# Patient Record
Sex: FEMALE | URBAN - METROPOLITAN AREA
[De-identification: names, ages, dates, MRNs, and addresses within clinical notes are randomized per-mention and may not be internally consistent; named-entity substitution may affect disease eponyms.]

---

## 2021-05-03 ENCOUNTER — APPOINTMENT (OUTPATIENT)
Dept: URBAN - METROPOLITAN AREA CLINIC 237 | Age: 61
Setting detail: DERMATOLOGY
End: 2021-05-03

## 2021-05-03 VITALS — TEMPERATURE: 97.3 F

## 2021-05-03 DIAGNOSIS — L82.1 OTHER SEBORRHEIC KERATOSIS: ICD-10-CM

## 2021-05-03 DIAGNOSIS — L81.4 OTHER MELANIN HYPERPIGMENTATION: ICD-10-CM

## 2021-05-03 DIAGNOSIS — D22 MELANOCYTIC NEVI: ICD-10-CM

## 2021-05-03 DIAGNOSIS — D18.0 HEMANGIOMA: ICD-10-CM

## 2021-05-03 PROBLEM — D18.01 HEMANGIOMA OF SKIN AND SUBCUTANEOUS TISSUE: Status: ACTIVE | Noted: 2021-05-03

## 2021-05-03 PROBLEM — D22.72 MELANOCYTIC NEVI OF LEFT LOWER LIMB, INCLUDING HIP: Status: ACTIVE | Noted: 2021-05-03

## 2021-05-03 PROBLEM — L81.9 DISORDER OF PIGMENTATION, UNSPECIFIED: Status: ACTIVE | Noted: 2021-05-03

## 2021-05-03 PROCEDURE — OTHER OBSERVATION: OTHER

## 2021-05-03 PROCEDURE — 99203 OFFICE O/P NEW LOW 30 MIN: CPT | Mod: 25

## 2021-05-03 PROCEDURE — OTHER BIOPSY BY PUNCH METHOD: OTHER

## 2021-05-03 PROCEDURE — 11104 PUNCH BX SKIN SINGLE LESION: CPT

## 2021-05-03 PROCEDURE — OTHER PHOTO-DOCUMENTATION: OTHER

## 2021-05-03 PROCEDURE — OTHER OBSERVATION AND MEASURE: OTHER

## 2021-05-03 PROCEDURE — OTHER REASSURANCE: OTHER

## 2021-05-03 PROCEDURE — OTHER COUNSELING: OTHER

## 2021-05-03 ASSESSMENT — LOCATION ZONE DERM
LOCATION ZONE: TOE
LOCATION ZONE: LEG
LOCATION ZONE: ARM
LOCATION ZONE: TRUNK

## 2021-05-03 ASSESSMENT — LOCATION SIMPLE DESCRIPTION DERM
LOCATION SIMPLE: LEFT THIGH
LOCATION SIMPLE: ABDOMEN
LOCATION SIMPLE: UPPER BACK
LOCATION SIMPLE: LEFT UPPER BACK
LOCATION SIMPLE: LEFT POSTERIOR UPPER ARM
LOCATION SIMPLE: RIGHT POSTERIOR UPPER ARM
LOCATION SIMPLE: LEFT 3RD TOE

## 2021-05-03 ASSESSMENT — LOCATION DETAILED DESCRIPTION DERM
LOCATION DETAILED: LEFT DISTAL POSTERIOR UPPER ARM
LOCATION DETAILED: RIGHT DISTAL POSTERIOR UPPER ARM
LOCATION DETAILED: RIGHT LATERAL ABDOMEN
LOCATION DETAILED: INFERIOR THORACIC SPINE
LOCATION DETAILED: LEFT MEDIAL UPPER BACK
LOCATION DETAILED: LEFT ANTERIOR PROXIMAL THIGH
LOCATION DETAILED: PERIUMBILICAL SKIN
LOCATION DETAILED: LEFT DORSAL 3RD TOE

## 2021-05-03 NOTE — PROCEDURE: PHOTO-DOCUMENTATION
Details (Free Text): 2
Photo Preface (Leave Blank If You Do Not Want): Photographs obtained today
Detail Level: Zone

## 2021-05-03 NOTE — PROCEDURE: BIOPSY BY PUNCH METHOD
Detail Level: Detailed
Size Of Lesion In Cm (Optional): 0
Validate That Anesthesia Is Not 0: No
Biopsy Type: H and E
Anesthesia Volume In Cc (Will Not Render If 0): 0.5
Suture Removal: 12 days
Notification Instructions: Patient will be notified of biopsy results. However, patient instructed to call the office if not contacted within 2 weeks.
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
Dressing: bandage
Anesthesia Type: 1% lidocaine with epinephrine
Hemostasis: Aluminum Chloride
Epidermal Sutures: 3-0 Nylon
Wound Care: Vaseline
Billing Type: Third-Party Bill
Validate Note Data (See Information Below): Yes
Post-Care Instructions: I reviewed with the patient in detail post-care instructions. Patient is to keep the biopsy site dry overnight, and then apply bacitracin twice daily until healed. Patient may apply hydrogen peroxide soaks to remove any crusting.
Punch Size In Mm: 4
Consent: Written consent was obtained and risks were reviewed including but not limited to scarring, infection, bleeding, scabbing, incomplete removal, nerve damage and allergy to anesthesia.
Information: Selecting Yes will display possible errors in your note based on the variables you have selected. This validation is only offered as a suggestion for you. PLEASE NOTE THAT THE VALIDATION TEXT WILL BE REMOVED WHEN YOU FINALIZE YOUR NOTE. IF YOU WANT TO FAX A PRELIMINARY NOTE YOU WILL NEED TO TOGGLE THIS TO 'NO' IF YOU DO NOT WANT IT IN YOUR FAXED NOTE.
Home Suture Removal Text: Patient was provided a home suture removal kit and will remove their sutures at home.  If they have any questions or difficulties they will call the office.

## 2021-05-17 ENCOUNTER — APPOINTMENT (OUTPATIENT)
Dept: URBAN - METROPOLITAN AREA CLINIC 237 | Age: 61
Setting detail: DERMATOLOGY
End: 2021-05-17

## 2021-05-17 DIAGNOSIS — L81.0 POSTINFLAMMATORY HYPERPIGMENTATION: ICD-10-CM

## 2021-05-17 DIAGNOSIS — L81.4 OTHER MELANIN HYPERPIGMENTATION: ICD-10-CM

## 2021-05-17 PROCEDURE — OTHER TREATMENT REGIMEN: OTHER

## 2021-05-17 PROCEDURE — OTHER PRESCRIPTION: OTHER

## 2021-05-17 PROCEDURE — OTHER SUTURE REMOVAL (NO GLOBAL PERIOD): OTHER

## 2021-05-17 PROCEDURE — 99213 OFFICE O/P EST LOW 20 MIN: CPT

## 2021-05-17 PROCEDURE — OTHER DIAGNOSIS COMMENT: OTHER

## 2021-05-17 PROCEDURE — OTHER COUNSELING: OTHER

## 2021-05-17 PROCEDURE — OTHER PATIENT SPECIFIC COUNSELING: OTHER

## 2021-05-17 RX ORDER — TRIAMCINOLONE ACETONIDE 1 MG/G
CREAM TOPICAL
Qty: 1 | Refills: 0 | Status: ERX | COMMUNITY
Start: 2021-05-17

## 2021-05-17 ASSESSMENT — LOCATION ZONE DERM
LOCATION ZONE: TRUNK
LOCATION ZONE: FINGER

## 2021-05-17 ASSESSMENT — LOCATION DETAILED DESCRIPTION DERM
LOCATION DETAILED: LEFT MID-UPPER BACK
LOCATION DETAILED: LEFT PROXIMAL DORSAL MIDDLE FINGER

## 2021-05-17 ASSESSMENT — LOCATION SIMPLE DESCRIPTION DERM
LOCATION SIMPLE: LEFT UPPER BACK
LOCATION SIMPLE: LEFT MIDDLE FINGER

## 2021-05-17 NOTE — PROCEDURE: DIAGNOSIS COMMENT
Render Risk Assessment In Note?: no
Comment: Bx Proven 05/07/21
Detail Level: Simple
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

## 2021-05-17 NOTE — PROCEDURE: PATIENT SPECIFIC COUNSELING
Discussed treatment of discoloration with topical such as hydroquinone.  Patient not really bothered by discoloration.  She has itching, but not on a daily basis.  Recommend she have a topical steroid to use at the first sign of itching to help prevent further hyperpigmentation.  She has a masssager that she uses for her shoulder that has a heating option, but she hasn’t been using this chronically.  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  Also recommend avoiding sun exposure to site.
Detail Level: Simple

## 2025-07-22 ENCOUNTER — APPOINTMENT (OUTPATIENT)
Dept: URBAN - METROPOLITAN AREA CLINIC 237 | Age: 65
Setting detail: DERMATOLOGY
End: 2025-07-22

## 2025-07-22 DIAGNOSIS — D49.2 NEOPLASM OF UNSPECIFIED BEHAVIOR OF BONE, SOFT TISSUE, AND SKIN: ICD-10-CM

## 2025-07-22 DIAGNOSIS — D22 MELANOCYTIC NEVI: ICD-10-CM

## 2025-07-22 PROBLEM — D22.72 MELANOCYTIC NEVI OF LEFT LOWER LIMB, INCLUDING HIP: Status: ACTIVE | Noted: 2025-07-22

## 2025-07-22 PROCEDURE — 11103 TANGNTL BX SKIN EA SEP/ADDL: CPT

## 2025-07-22 PROCEDURE — 99202 OFFICE O/P NEW SF 15 MIN: CPT | Mod: 25

## 2025-07-22 PROCEDURE — OTHER COUNSELING: OTHER

## 2025-07-22 PROCEDURE — 11102 TANGNTL BX SKIN SINGLE LES: CPT

## 2025-07-22 PROCEDURE — OTHER OBSERVATION: OTHER

## 2025-07-22 PROCEDURE — OTHER BIOPSY BY SHAVE METHOD: OTHER

## 2025-07-22 ASSESSMENT — LOCATION DETAILED DESCRIPTION DERM
LOCATION DETAILED: RIGHT CENTRAL FRONTAL SCALP
LOCATION DETAILED: LEFT DORSAL 3RD TOE
LOCATION DETAILED: LEFT NASAL SIDEWALL

## 2025-07-22 ASSESSMENT — LOCATION SIMPLE DESCRIPTION DERM
LOCATION SIMPLE: SCALP
LOCATION SIMPLE: LEFT NOSE
LOCATION SIMPLE: LEFT 3RD TOE

## 2025-07-22 ASSESSMENT — LOCATION ZONE DERM
LOCATION ZONE: SCALP
LOCATION ZONE: NOSE
LOCATION ZONE: TOE